# Patient Record
(demographics unavailable — no encounter records)

---

## 2025-04-15 NOTE — PHYSICAL EXAM
[Appropriately responsive] : appropriately responsive [Alert] : alert [No Acute Distress] : no acute distress [Soft] : soft [Non-tender] : non-tender [Non-distended] : non-distended [No HSM] : No HSM [No Lesions] : no lesions [No Mass] : no mass [Oriented x3] : oriented x3 [Labia Majora] : normal [Labia Minora] : normal [Normal] : normal [Uterine Adnexae] : normal [Scant] : There was scant vaginal bleeding [FreeTextEntry4] : no active bleeding

## 2025-04-15 NOTE — PLAN
[FreeTextEntry1] : PMB  hx of tamoxifen use now on anastrzole no period for  a few yrs good news: EE thin 10 month ago needs repeat TV sono now and then possible biopsy vs D and C, will call

## 2025-04-15 NOTE — HISTORY OF PRESENT ILLNESS
[FreeTextEntry1] : Maricruz Trinh presents for follow up. She states she was in menopause and sudden got a full period.

## 2025-06-10 NOTE — HISTORY OF PRESENT ILLNESS
[FreeTextEntry1] : presents for a well women visit and gyn exam  [TextBox_19] : *does through her br surgeon

## 2025-06-10 NOTE — PLAN
[FreeTextEntry1] : annual: pap and hpv  hx of tamoxifen use, then anastrazole, but now back on tamoxifen: em thin 3 mm /nomal just a few months ago, thought to be in menopause based on blood blood , em biopsy fine in past   *DR TREVINO oncologist, gave OK to vaginal estrogen (may add suppositories, those alone were not working but she still has some) colonoscopy shaun  sees Dr Oviedo for breast (sp right lumpectomy) and she orders imaging

## 2025-06-10 NOTE — PHYSICAL EXAM
[MA] : MA [FreeTextEntry2] : kassidy [Appropriately responsive] : appropriately responsive [Alert] : alert [No Acute Distress] : no acute distress [No Lymphadenopathy] : no lymphadenopathy [Regular Rate Rhythm] : regular rate rhythm [No Murmurs] : no murmurs [Clear to Auscultation B/L] : clear to auscultation bilaterally [Soft] : soft [Non-tender] : non-tender [Non-distended] : non-distended [No HSM] : No HSM [No Lesions] : no lesions [No Mass] : no mass [Oriented x3] : oriented x3 [Examination Of The Breasts] : a normal appearance [No Masses] : no breast masses were palpable [Labia Majora] : normal [Labia Minora] : normal [Scant] : There was scant vaginal bleeding [Normal] : normal [Uterine Adnexae] : normal [FreeTextEntry4] : no active bleeding